# Patient Record
Sex: FEMALE | Race: BLACK OR AFRICAN AMERICAN | HISPANIC OR LATINO
[De-identification: names, ages, dates, MRNs, and addresses within clinical notes are randomized per-mention and may not be internally consistent; named-entity substitution may affect disease eponyms.]

---

## 2023-07-14 ENCOUNTER — NON-APPOINTMENT (OUTPATIENT)
Age: 23
End: 2023-07-14

## 2023-07-20 ENCOUNTER — NON-APPOINTMENT (OUTPATIENT)
Age: 23
End: 2023-07-20

## 2023-07-20 ENCOUNTER — APPOINTMENT (OUTPATIENT)
Dept: OTOLARYNGOLOGY | Facility: CLINIC | Age: 23
End: 2023-07-20
Payer: COMMERCIAL

## 2023-07-20 VITALS
WEIGHT: 190 LBS | BODY MASS INDEX: 33.66 KG/M2 | SYSTOLIC BLOOD PRESSURE: 115 MMHG | DIASTOLIC BLOOD PRESSURE: 80 MMHG | HEART RATE: 76 BPM | HEIGHT: 63 IN

## 2023-07-20 DIAGNOSIS — Z87.09 PERSONAL HISTORY OF OTHER DISEASES OF THE RESPIRATORY SYSTEM: ICD-10-CM

## 2023-07-20 DIAGNOSIS — Z82.49 FAMILY HISTORY OF ISCHEMIC HEART DISEASE AND OTHER DISEASES OF THE CIRCULATORY SYSTEM: ICD-10-CM

## 2023-07-20 DIAGNOSIS — Z78.9 OTHER SPECIFIED HEALTH STATUS: ICD-10-CM

## 2023-07-20 DIAGNOSIS — Z83.3 FAMILY HISTORY OF DIABETES MELLITUS: ICD-10-CM

## 2023-07-20 DIAGNOSIS — Z87.891 PERSONAL HISTORY OF NICOTINE DEPENDENCE: ICD-10-CM

## 2023-07-20 PROBLEM — Z00.00 ENCOUNTER FOR PREVENTIVE HEALTH EXAMINATION: Status: ACTIVE | Noted: 2023-07-20

## 2023-07-20 PROCEDURE — 99203 OFFICE O/P NEW LOW 30 MIN: CPT

## 2023-07-20 RX ORDER — METHYLPREDNISOLONE 4 MG/1
4 TABLET ORAL
Qty: 1 | Refills: 0 | Status: ACTIVE | COMMUNITY
Start: 2023-07-20 | End: 1900-01-01

## 2023-07-20 RX ORDER — AMOXICILLIN AND CLAVULANATE POTASSIUM 875; 125 MG/1; 1/1
875-125 TABLET, FILM COATED ORAL
Refills: 0 | Status: ACTIVE | COMMUNITY

## 2023-07-20 RX ORDER — IBUPROFEN 600 MG/1
600 TABLET, FILM COATED ORAL
Refills: 0 | Status: ACTIVE | COMMUNITY

## 2023-07-20 RX ORDER — CLINDAMYCIN HYDROCHLORIDE 300 MG/1
300 CAPSULE ORAL EVERY 8 HOURS
Qty: 21 | Refills: 0 | Status: ACTIVE | COMMUNITY
Start: 2023-07-20 | End: 1900-01-01

## 2023-07-20 NOTE — PHYSICAL EXAM
[Midline] : trachea located in midline position [de-identified] : 3+ bilateral tonsils with white exudate. No palpable fluid collection or fluctuance. [Normal] : no rashes

## 2023-07-20 NOTE — HISTORY OF PRESENT ILLNESS
[de-identified] : Marie Tran is a 22 yo female with hx adenoidectomy and tonsillotomy who presents for evaluation of tonsillitis. She began having sore throat about one week ago. She went to urgent care five days ago when she noticed white exudate. She went to urgent care and she was prescribed augmentin. Rapid strep was negative. She notes that white exudate has decreased but her tonsils are still swollen. She feels that the sore throat is improving. She denies significant odynophagia or dysphagia. She denies dyspnea. She denies fevers or chills. This was her first episode of tonsillitis since tonsillotomy as a child.

## 2023-07-20 NOTE — REVIEW OF SYSTEMS
[Ear Pain] : ear pain [Ear Itch] : ear itch [Throat Pain] : throat pain [Anxiety] : anxiety [Swollen Glands] : swollen glands [Negative] : Endocrine [FreeTextEntry1] : Difficulty swallowing

## 2023-07-20 NOTE — ASSESSMENT
[FreeTextEntry1] : Marie Tran presents for evaluation. She has acute tonsillitis, currently on augmentin. There is no sign of peritonsillar abscess today. Will switch antibiotic and start oral steroids.\par \par - Start clindamycin x 7 days. Side effects were discussed and include but are not limited to nausea, vomiting, diarrhea, and skin rash. Stop augmentin.\par - Start medrol dose pack. The potential side effects of high-dose steroid use were discussed at length. These risks include but are not limited to: increased appetite, insomnia, fluid retention, mood swings, weight gain, change in blood pressure, high blood glucose, possible adrenal suppression, osteoporosis, avascular necrosis of the hip, menstrual irregularities (if applicable), and cataracts\par - Saltwater gargles.\par - Follow up in 2 weeks.

## 2023-08-09 ENCOUNTER — APPOINTMENT (OUTPATIENT)
Dept: OTOLARYNGOLOGY | Facility: CLINIC | Age: 23
End: 2023-08-09
Payer: COMMERCIAL

## 2023-08-09 VITALS
WEIGHT: 190 LBS | DIASTOLIC BLOOD PRESSURE: 69 MMHG | SYSTOLIC BLOOD PRESSURE: 102 MMHG | HEIGHT: 63 IN | BODY MASS INDEX: 33.66 KG/M2 | HEART RATE: 96 BPM

## 2023-08-09 DIAGNOSIS — J03.90 ACUTE TONSILLITIS, UNSPECIFIED: ICD-10-CM

## 2023-08-09 PROCEDURE — 99213 OFFICE O/P EST LOW 20 MIN: CPT

## 2023-08-09 NOTE — ASSESSMENT
[FreeTextEntry1] : Marie Tran presents for evaluation. She completed oral antibiotic and oral steroids for acute tonsillitis. Infection is resolved on exam. She has 2-3+ tonsils bilaterally with no inflammation or exudate and no evidence of peritonsillar abscess. She had previous tonsillotomy.   - Can continue saltwater gargles prn. - f/u prn

## 2023-08-09 NOTE — PHYSICAL EXAM
[Midline] : trachea located in midline position [de-identified] : 2-3+ tonsils bilaterally. No white exudate. No palpable surrounding fluid collection. [Normal] : no rashes

## 2023-08-09 NOTE — HISTORY OF PRESENT ILLNESS
- ?COPD vs ADHF   - Continue home spiriva  - Albuterol rescue inahler PRN for wheezing  - Oxygen as needed to maintain sp02 >88% (on home O2)  - Monitor respiratory status for any acute change      [de-identified] : Marie Tran is a 22 yo female with hx adenoidectomy and tonsillotomy who presents for evaluation of tonsillitis. She began having sore throat about one week ago. She went to urgent care five days ago when she noticed white exudate. She went to urgent care and she was prescribed augmentin. Rapid strep was negative. She notes that white exudate has decreased but her tonsils are still swollen. She feels that the sore throat is improving. She denies significant odynophagia or dysphagia. She denies dyspnea. She denies fevers or chills. This was her first episode of tonsillitis since tonsillotomy as a child. [FreeTextEntry1] : 8/9/23 - Marie presents for follow-up. She completed clindamycin and oral steroids. She notes significant improvement in symptoms. She denies sore throat, dysphagia, odynophagia. She denies fevers or chills. She still feels her tonsils are somewhat swollen. No fevers or chills.

## 2024-01-25 ENCOUNTER — NON-APPOINTMENT (OUTPATIENT)
Age: 24
End: 2024-01-25

## 2024-03-06 ENCOUNTER — NON-APPOINTMENT (OUTPATIENT)
Age: 24
End: 2024-03-06